# Patient Record
Sex: MALE
[De-identification: names, ages, dates, MRNs, and addresses within clinical notes are randomized per-mention and may not be internally consistent; named-entity substitution may affect disease eponyms.]

---

## 2018-03-28 ENCOUNTER — HOSPITAL ENCOUNTER (EMERGENCY)
Dept: HOSPITAL 42 - ED | Age: 68
Discharge: LEFT BEFORE DISCHARGE | End: 2018-03-28
Payer: MEDICARE

## 2018-03-28 VITALS — BODY MASS INDEX: 33.7 KG/M2

## 2018-03-28 DIAGNOSIS — T40.2X5A: ICD-10-CM

## 2018-03-28 DIAGNOSIS — T42.6X5A: ICD-10-CM

## 2018-03-28 DIAGNOSIS — I10: ICD-10-CM

## 2018-03-28 DIAGNOSIS — R42: Primary | ICD-10-CM

## 2018-03-28 DIAGNOSIS — Z87.891: ICD-10-CM

## 2018-03-28 LAB
ALBUMIN SERPL-MCNC: 4 G/DL (ref 3–4.8)
ALBUMIN/GLOB SERPL: 1.1 {RATIO} (ref 1.1–1.8)
ALT SERPL-CCNC: 34 U/L (ref 7–56)
APTT BLD: 30.2 SECONDS (ref 25.1–36.5)
AST SERPL-CCNC: 34 U/L (ref 17–59)
BASOPHILS # BLD AUTO: 0.03 K/MM3 (ref 0–2)
BASOPHILS NFR BLD: 0.4 % (ref 0–3)
BNP SERPL-MCNC: 886 PG/ML (ref 0–450)
BUN SERPL-MCNC: 25 MG/DL (ref 7–21)
CALCIUM SERPL-MCNC: 9.8 MG/DL (ref 8.4–10.5)
EOSINOPHIL # BLD: 0.2 10*3/UL (ref 0–0.7)
EOSINOPHIL NFR BLD: 2.4 % (ref 1.5–5)
ERYTHROCYTE [DISTWIDTH] IN BLOOD BY AUTOMATED COUNT: 15.3 % (ref 11.5–14.5)
GFR NON-AFRICAN AMERICAN: 43
GRANULOCYTES # BLD: 5.22 10*3/UL (ref 1.4–6.5)
GRANULOCYTES NFR BLD: 68.7 % (ref 50–68)
HGB BLD-MCNC: 11.8 G/DL (ref 14–18)
INR PPP: 1.23 (ref 0.93–1.08)
LYMPHOCYTES # BLD: 1.4 10*3/UL (ref 1.2–3.4)
LYMPHOCYTES NFR BLD AUTO: 18.4 % (ref 22–35)
MCH RBC QN AUTO: 31.9 PG (ref 25–35)
MCHC RBC AUTO-ENTMCNC: 34.5 G/DL (ref 31–37)
MCV RBC AUTO: 92.4 FL (ref 80–105)
MONOCYTES # BLD AUTO: 0.8 10*3/UL (ref 0.1–0.6)
MONOCYTES NFR BLD: 10.1 % (ref 1–6)
PLATELET # BLD: 184 10^3/UL (ref 120–450)
PMV BLD AUTO: 10.5 FL (ref 7–11)
PROTHROMBIN TIME: 14.1 SECONDS (ref 9.4–12.5)
RBC # BLD AUTO: 3.7 10^6/UL (ref 3.5–6.1)
TROPONIN I SERPL-MCNC: < 0.01 NG/ML
WBC # BLD AUTO: 7.6 10^3/UL (ref 4.5–11)

## 2018-03-28 PROCEDURE — 71045 X-RAY EXAM CHEST 1 VIEW: CPT

## 2018-03-28 PROCEDURE — 93005 ELECTROCARDIOGRAM TRACING: CPT

## 2018-03-28 PROCEDURE — 96360 HYDRATION IV INFUSION INIT: CPT

## 2018-03-28 PROCEDURE — 83880 ASSAY OF NATRIURETIC PEPTIDE: CPT

## 2018-03-28 PROCEDURE — 85610 PROTHROMBIN TIME: CPT

## 2018-03-28 PROCEDURE — 85025 COMPLETE CBC W/AUTO DIFF WBC: CPT

## 2018-03-28 PROCEDURE — 99285 EMERGENCY DEPT VISIT HI MDM: CPT

## 2018-03-28 PROCEDURE — 93971 EXTREMITY STUDY: CPT

## 2018-03-28 PROCEDURE — 85730 THROMBOPLASTIN TIME PARTIAL: CPT

## 2018-03-28 PROCEDURE — 80053 COMPREHEN METABOLIC PANEL: CPT

## 2018-03-28 PROCEDURE — 84484 ASSAY OF TROPONIN QUANT: CPT

## 2018-03-28 NOTE — US
PROCEDURE:  Right lower extremity venous US 



HISTORY:

Leg pain and swelling. Evaluate for DVT.



PHYSICIAN(S):  Jt Farley M.D.



TECHNIQUE:

Duplex sonography and color-flow Doppler with graded compression were 

used to evaluate the deep venous system of the right lower extremity. 

The exam is limited by body habitus and edema.  The tibial veins are 

not well seen



FINDINGS:

The visualized deep venous system of the right lower extremity is 

sonographically normal and compressible. Normal waveforms and 

augmentation are seen. There is no sonographic evidence for deep 

venous thrombosis in the visualized segments of the right lower 

extremity.



IMPRESSION:

1. No sonographic evidence for deep venous thrombosis in the 

visualized segments of the right lower extremity.

## 2018-03-28 NOTE — ED PDOC
Arrival/HPI





- General


Chief Complaint: Dizziness/Lightheaded


Time Seen by Provider: 03/28/18 14:11


Historian: Patient





- History of Present Illness


Narrative History of Present Illness (Text): 


you were treated in the ED today for having right knee surgery about 1 week ago 

and took percocet/gabapentin and felt dizzy/lightheadness but otherwise without 

any nausea/vomiting/headache/difficulty breathing/chest pain/abdomen pain/

numbness/tingling/loss of limb function/pain with urination/prior blood clots/

prior cancer/recent travel. 


03/28/18 15:17





Time/Duration: 4-6 hours


Symptom Onset: Gradual


Symptom Course: Improving


Quality: Other (no current pain)


Activities at Onset: Rest


Context: Sitting





Past Medical History





- Provider Review


Nursing Documentation Reviewed: Yes





- Travel History


Have you recently traveled outside [place] w/in the past 3 mons?: No





- Tetanus Immunization


Tetanus Immunization: Unknown





- Cardiac


Hx Cardiac Disorders: Yes


Hx Hypertension: Yes





- Pulmonary


Hx Respiratory Disorders: No





- Neurological


Hx Neurological Disorder: No





- HEENT


Hx HEENT Disorder: No





- Renal


Hx Renal Disorder: No





- Endocrine/Metabolic


Hx Endocrine Disorders: No





- Hematological/Oncological


Hx Blood Disorders: No





- Integumentary


Hx Dermatological Disorder: No





- Musculoskeletal/Rheumatological


Hx Musculoskeletal Disorders: Yes





- Gastrointestinal


Hx Gastrointestinal Disorders: No





- Genitourinary/Gynecological


Hx Genitourinary Disorders: No





- Psychiatric


Hx Psychophysiologic Disorder: No


Hx Substance Use: No





- Surgical History


Hx Orthopedic Surgery: Yes (R KNEE, L KNEE)





- Anesthesia


Hx Anesthesia Reactions: No


Hx Malignant Hyperthermia: No





- Suicidal Assessment


Feels Threatened In Home Enviroment: No





Family/Social History





- Physician Review


Nursing Documentation Reviewed: Yes


Family/Social History: No Known Family HX


Smoking Status: Former Smoker


Hx Alcohol Use: Yes (OCCASIONAL)


Hx Substance Use: No


Hx Substance Use Treatment: No





Allergies/Home Meds


Allergies/Adverse Reactions: 


Allergies





No Known Allergies Allergy (Verified 03/28/18 14:22)


 








Home Medications: 


 Home Meds











 Medication  Instructions  Recorded  Confirmed


 


Aspirin [Aspir 81] 81 mg PO DAILY 02/13/13 03/28/18


 


Carvedilol Phosphate [Coreg Cr] 40 mg PO DAILY 02/13/13 03/28/18


 


Ezetimibe/Simvastatin [Vytorin 10 1 tab PO DAILY 11/29/13 03/28/18





mg-40 mg]   


 


Losartan [Cozaar] 100 mg PO DAILY 11/29/13 03/28/18


 


Acetaminophen/Oxycodone Hydr 1 tab PO Q6 PRN 12/09/13 03/28/18





[Percocet 325 mg-5 mg]   














Review of Systems





- Review of Systems


Constitutional: Normal


Eyes: Normal


ENT: Normal


Respiratory: Normal


Cardiovascular: Normal


Gastrointestinal: Normal


Genitourinary Male: Normal


Musculoskeletal: Normal


Skin: Normal


Neurological: Dizziness


Endocrine: Normal


Hemo/Lymphatic: Normal


Psychiatric: Normal





Physical Exam


Vital Signs Reviewed: Yes


Vital Signs











  Temp Pulse Resp BP Pulse Ox


 


 03/28/18 14:58   60  16  105/69  96


 


 03/28/18 14:24  97.8 F  65  16  84/54 L  91 L











Temperature: Afebrile


Blood Pressure: Normal


Pulse: Regular


Respiratory Rate: Normal


Appearance: Positive for: Well-Appearing, Non-Toxic, Comfortable


Pain Distress: None


Mental Status: Positive for: Alert and Oriented X 3


Finger Stick Blood Glucose: 131





- Systems Exam


Head: Present: Atraumatic, Normocephalic


Pupils: Present: PERRL


Extroacular Muscles: Present: EOMI


Conjunctiva: Present: Normal


Ears: Present: Normal


Mouth: Present: Moist Mucous Membranes


Pharnyx: Present: Normal


Nose (External): Present: Atraumatic


Nose (Internal): Present: Normal Inspection


Neck: Present: Normal Range of Motion


Respiratory/Chest: Present: Clear to Auscultation, Good Air Exchange


Cardiovascular: Present: Regular Rate and Rhythm


Abdomen: No: Tenderness, Distention, Normal Bowel Sounds, Peritoneal Signs, 

Rebound, Guarding, McBurney's Point Tender, Rovsing's Sign Present, Hernias, 

Feeding Tubes, Ostomy Tubes, Mass/Organomegaly, Scars, Other


Upper Extremity: Present: Normal Inspection


Lower Extremity: Present: Other (right knee healing wound without redness and 

otherwise mild swelling of the calf without tenderness and otherwise mild right 

lower leg bruising and otherwise good pink/warm/sensation/foot PT pulse positive

)


Neurological: Present: GCS=15, CN II-XII Intact, Speech Normal, Motor Func 

Grossly Intact


Skin: Present: Warm, Normal Color


Psychiatric: Present: Alert, Oriented x 3, Normal Insight, Normal Concentration





Medical Decision Making


ED Course and Treatment: 


you were treated in the ED today for having right knee surgery about 1 week ago 

and took percocet/gabapentin and felt dizzy/lightheadness but otherwise without 

any nausea/vomiting/headache/difficulty breathing/chest pain/abdomen pain/

numbness/tingling/loss of limb function/pain with urination/prior blood clots/

prior cancer/recent travel. You were otherwise breathing easily, smiling and 

talking easily, good strength/sensation, walking, clear lungs, no abdomen 

tenderness, right knee healing wound without redness and otherwise mild 

swelling of the calf without tenderness and otherwise mild right lower leg 

bruising and otherwise good pink/warm/sensation/foot pulse positive, no fever 

temp 97.8, stable heart rate 65, stable breathing rate 16, stable oxygen level 

91% and repeat 96% room air, initial low blood pressure 84/54 and repeat 105/69 

which we recommend repeat in 2-3 days primary care office to determine further 

treatment, you have blood tests no infection count 7.6, stable blood level 

hemoglobin 11/platelets 184, stable chemistry, bun elevated mildly 24, 

creatinine elevated mildly 1.6, glucose mildly elevated 124, Liver bilirubin 

elevated 3.3, heart blood test negative less than 0.01, heart failure test 886 

mildly elevated, radiology chest xray mild vascular markings, right lower 

extremity per technician negative for deep vein clot, ECG normal sinus rhythm, 

intravenous fluids, observation done in the ED with improvement, recommend to 

stay in the hospital for further testing/observation but you refused and 

cautioned for complications/death and you stated you feel symptoms related to 

medications and will followup primary care tomorrow, counselled to monitor 

symptoms. 1. Recommend follow-up primary care tomorrow to review symptoms, 

consideration for ventillation/perfusion scan to ensure no chest findings of 

clot, ultrasound of abdomen for elevated bilirubin to ensure no complications 

and referral to cardiology, gastroenterology, pulmonary, urology, endocrine 

clinic to ensure no complications/cancer development. 4. If any worsening pain, 

fever, chills, nausea, vomiting, difficulty breathing, numbness, loss of limb 

function, pain with urination or any medical condition then return to the ED.





03/28/18 16:43





03/28/18 16:55





Reassessment Condition: Re-examined, Improved





- Lab Interpretations


Lab Results: 








 03/28/18 14:50 





 03/28/18 14:50 





 Lab Results





03/28/18 15:09: Troponin I < 0.01, NT-Pro-B Natriuret Pep 886 H


03/28/18 14:50: Sodium 141, Potassium 4.2, Chloride 103, Carbon Dioxide 27, 

Anion Gap 15, BUN 25 H, Creatinine 1.6 H, Est GFR ( Amer) 52, Est GFR (

Non-Af Amer) 43, Random Glucose 124 H, Calcium 9.8, Total Bilirubin 3.3 H, AST 

34, ALT 34, Alkaline Phosphatase 84, Total Protein 7.5, Albumin 4.0, Globulin 

3.5, Albumin/Globulin Ratio 1.1


03/28/18 14:50: PT 14.1 H, INR 1.23 H, APTT 30.2


03/28/18 14:50: WBC 7.6, RBC 3.70, Hgb 11.8 L, Hct 34.2 L, MCV 92.4, MCH 31.9, 

MCHC 34.5, RDW 15.3 H, Plt Count 184, MPV 10.5, Gran % 68.7 H, Lymph % (Auto) 

18.4 L, Mono % (Auto) 10.1 H, Eos % (Auto) 2.4, Baso % (Auto) 0.4, Gran # 5.22, 

Lymph # (Auto) 1.4, Mono # (Auto) 0.8 H, Eos # (Auto) 0.2, Baso # (Auto) 0.03








I have reviewed the lab results: Yes





- RAD Interpretation


Radiology Orders: 








03/28/18 15:14


CHEST ONE VIEW [RAD] Stat 





03/28/18 15:15


DUPLEX LOWER EXTRM VEIN RIGHT [US] Stat 





03/28/18 16:28


LUNG PERF & VENT SCAN [NM] Stat 





03/28/18 16:44


GALL BLADDER [US] Stat 














- EKG Interpretation


Interpreted by ED Physician: Yes (NSR, flipped t waves iii, avr, avf, v1, v2, 

v3 similar to 11/29/13)


Type: 12 lead EKG





- Medication Orders


Current Medication Orders: 











Discontinued Medications





Sodium Chloride (Sodium Chloride 0.9%)  1,000 mls @ 999 mls/hr IV .Q1H1M STA


   Stop: 03/28/18 16:16


   Last Admin: 03/28/18 15:25  Dose: 999 mls/hr





eMAR Start Stop


 Document     03/28/18 15:25  [facility]  (Rec: 03/28/18 15:26  [facility]  [id number])


     Intravenous Solution


      Start Date                                 03/28/18


      Start Time                                 15:26


      End Date                                   03/28/18


      End time                                   16:27


      Total Infusion Time                        61














Disposition/Present on Arrival





- Present on Arrival


Any Indicators Present on Arrival: Yes


History of DVT/PE: No


History of Uncontrolled Diabetes: No


Urinary Catheter: No


History of Decub. Ulcer: No


History Surgical Site Infection Following: None





- Disposition


Have Diagnosis and Disposition been Completed?: Yes


Diagnosis: 


 Medication adverse effect





Disposition: AGAINST MEDICAL ADVICE


Disposition Time: 16:58


Patient Plan: Discharge


Condition: IMPROVED


Discharge Instructions (ExitCare):  Side Effects From Medicines


Additional Instructions: 


you were treated in the ED today for having right knee surgery about 1 week ago 

and took percocet/gabapentin and felt dizzy/lightheadness but otherwise without 

any nausea/vomiting/headache/difficulty breathing/chest pain/abdomen pain/

numbness/tingling/loss of limb function/pain with urination/prior blood clots/

prior cancer/recent travel. You were otherwise breathing easily, smiling and 

talking easily, good strength/sensation, walking, clear lungs, no abdomen 

tenderness, right knee healing wound without redness and otherwise mild 

swelling of the calf without tenderness and otherwise mild right lower leg 

bruising and otherwise good pink/warm/sensation/foot pulse positive, no fever 

temp 97.8, stable heart rate 65, stable breathing rate 16, stable oxygen level 

91% and repeat 96% room air, initial low blood pressure 84/54 and repeat 105/69 

which we recommend repeat in 2-3 days primary care office to determine further 

treatment, you have blood tests no infection count 7.6, stable blood level 

hemoglobin 11/platelets 184, stable chemistry, bun elevated mildly 24, 

creatinine elevated mildly 1.6, glucose mildly elevated 124, Liver bilirubin 

elevated 3.3, heart blood test negative less than 0.01, heart failure test 886 

mildly elevated, radiology chest xray mild vascular markings, right lower 

extremity per technician negative for deep vein clot, ECG normal sinus rhythm, 

intravenous fluids, observation done in the ED with improvement, recommend to 

stay in the hospital for further testing/observation but you refused and 

cautioned for complications/death and you stated you feel symptoms related to 

medications and will followup primary care tomorrow, counselled to monitor 

symptoms. 1. Recommend follow-up primary care tomorrow to review symptoms, 

consideration for ventillation/perfusion scan to ensure no chest findings of 

clot, ultrasound of abdomen for elevated bilirubin to ensure no complications 

and referral to cardiology, gastroenterology, pulmonary, urology, endocrine 

clinic to ensure no complications/cancer development. 4. If any worsening pain, 

fever, chills, nausea, vomiting, difficulty breathing, numbness, loss of limb 

function, pain with urination or any medical condition then return to the ED.


Forms:  Reppler (English)

## 2018-03-28 NOTE — RAD
PROCEDURE:  CHEST RADIOGRAPH, 1 VIEW



HISTORY:

67yoM, lightheadedness



COMPARISON:

None available.



FINDINGS:



LUNGS:

Clear.



PLEURA:

No pneumothorax or pleural fluid seen.



CARDIOVASCULAR:

Mild cardiomegaly



OSSEOUS STRUCTURES:

No significant abnormalities.



VISUALIZED UPPER ABDOMEN:

Normal.



OTHER FINDINGS:

None. 



IMPRESSION:

No active disease.

## 2018-03-28 NOTE — CARD
--------------- APPROVED REPORT --------------





EKG Measurement

Heart [id number]

[place] [id number]

[id number]

[id number]

[id number]



<Conclusion>

Normal sinus rhythm

Inferior infarct, age undetermined

Cannot rule out Anterior infarct, age undetermined

Abnormal ECG

## 2018-03-29 VITALS — HEART RATE: 70 BPM | DIASTOLIC BLOOD PRESSURE: 76 MMHG | SYSTOLIC BLOOD PRESSURE: 116 MMHG | OXYGEN SATURATION: 98 %

## 2018-03-29 VITALS — TEMPERATURE: 98 F | RESPIRATION RATE: 18 BRPM
